# Patient Record
Sex: FEMALE | Race: WHITE | NOT HISPANIC OR LATINO
[De-identification: names, ages, dates, MRNs, and addresses within clinical notes are randomized per-mention and may not be internally consistent; named-entity substitution may affect disease eponyms.]

---

## 2024-04-10 PROBLEM — Z00.00 ENCOUNTER FOR PREVENTIVE HEALTH EXAMINATION: Status: ACTIVE | Noted: 2024-04-10

## 2024-04-25 ENCOUNTER — APPOINTMENT (OUTPATIENT)
Dept: ORTHOPEDIC SURGERY | Facility: CLINIC | Age: 74
End: 2024-04-25
Payer: MEDICARE

## 2024-04-25 VITALS — BODY MASS INDEX: 24.11 KG/M2 | WEIGHT: 150 LBS | HEIGHT: 66 IN

## 2024-04-25 DIAGNOSIS — E07.9 DISORDER OF THYROID, UNSPECIFIED: ICD-10-CM

## 2024-04-25 DIAGNOSIS — I10 ESSENTIAL (PRIMARY) HYPERTENSION: ICD-10-CM

## 2024-04-25 DIAGNOSIS — M16.11 UNILATERAL PRIMARY OSTEOARTHRITIS, RIGHT HIP: ICD-10-CM

## 2024-04-25 DIAGNOSIS — E78.00 PURE HYPERCHOLESTEROLEMIA, UNSPECIFIED: ICD-10-CM

## 2024-04-25 DIAGNOSIS — Z78.9 OTHER SPECIFIED HEALTH STATUS: ICD-10-CM

## 2024-04-25 PROCEDURE — 99214 OFFICE O/P EST MOD 30 MIN: CPT

## 2024-04-25 RX ORDER — LEVOTHYROXINE SODIUM 0.09 MG/1
88 TABLET ORAL
Refills: 0 | Status: ACTIVE | COMMUNITY

## 2024-04-25 RX ORDER — ROSUVASTATIN CALCIUM 10 MG/1
10 TABLET, FILM COATED ORAL
Refills: 0 | Status: ACTIVE | COMMUNITY

## 2024-04-25 RX ORDER — LOSARTAN POTASSIUM 100 MG/1
TABLET, FILM COATED ORAL
Refills: 0 | Status: ACTIVE | COMMUNITY

## 2024-04-25 RX ORDER — MELOXICAM 15 MG/1
15 TABLET ORAL
Qty: 45 | Refills: 0 | Status: ACTIVE | COMMUNITY
Start: 2024-04-25 | End: 1900-01-01

## 2024-04-25 RX ORDER — CARVEDILOL 3.12 MG/1
TABLET, FILM COATED ORAL
Refills: 0 | Status: ACTIVE | COMMUNITY

## 2024-04-25 NOTE — DISCUSSION/SUMMARY
[de-identified] : The patient's current medication management of their orthopedic diagnosis was reviewed today:   (1) We discussed a comprehensive treatment plan that included possible pharmaceutical management involving the use of prescription strength medications including but not limited to options such as oral Naprosyn 500mg BID, once daily Meloxicam 15 mg, or 500-650 mg Tylenol versus over the counter oral medications and topical prescription NSAID Pennsaid vs over the counter Voltaren gel.   (2) There is a moderate risk of morbidity with further treatment, especially from use of prescription strength medications and possible side effects of these medications which include upset stomach with oral medications, skin reactions to topical medications and cardiac/renal issues with long term use.   (3) I recommended that the patient follow-up with their medical physician to discuss any significant specific potential issues with long term medication use such as interactions with current medications or with exacerbation of underlying medical comorbidities.   (4) The benefits and risks associated with use of injectable, oral or topical, prescription and over the counter anti-inflammatory medications were discussed with the patient. The patient voiced understanding of the risks including but not limited to bleeding, stroke, kidney dysfunction, heart disease, and were referred to the black box warning label for further information.  Prior to appointment and during encounter with patient extensive medical records were reviewed including but not limited to, hospital records, out patient records, imaging results, and lab data. During this appointment the patient was examined, diagnoses were discussed and explained in a face to face manner. In addition extensive time was spent reviewing aforementioned diagnostic studies. Counseling including abnormal image results, differential diagnoses, treatment options, risk and benefits, lifestyle changes, current condition, and current medications was performed. Patient's comments, questions, and concerns were address and patient verbalized understanding.

## 2024-04-25 NOTE — ASSESSMENT
[FreeTextEntry1] : 74F p/w R hip OA  Meloxicam for pain continue HEP Discussed NAIN when she is read return as needed   The natural progression of Osteoarthritis was explained to the patient. We discussed the possible treatment options from conservative to operative. These included NSAIDS, Glucosamine and Chondrotin sulfate, and Physical Therapy. We also discussed that at some point they may progress to needed a NAIN. Information and pamphlets were given.

## 2024-04-25 NOTE — HISTORY OF PRESENT ILLNESS
[Gradual] : gradual [0] : 0 [9] : 9 [Radiating] : radiating [Sharp] : sharp [Intermittent] : intermittent [Rest] : rest [Sitting] : sitting [Lying in bed] : lying in bed [Retired] : Work status: retired [de-identified] : 74F p/w R hip pain. She complains of groin pain when rotating her hip. Known history of OA, trouble with shoes and socks, getting into a car. Pain is not severe, no pain with ambulation. [] : no [FreeTextEntry1] : right hip  [FreeTextEntry5] : patients' hip has been hurting for to long that she can't remember when it started.  [FreeTextEntry6] : pinching  [FreeTextEntry7] : to groin  [FreeTextEntry8] : driving/sitting  [FreeTextEntry9] : HEP [de-identified] : certain sitting positions and getting up  [de-identified] : Dr. Connolly  [de-identified] : none

## 2024-04-25 NOTE — PHYSICAL EXAM
[] : groin tenderness [Right] : right hip with pelvis [Severe arthritis (Tonnis Grade 3)] : Severe arthritis (Tonnis Grade 3)

## 2024-04-30 ENCOUNTER — APPOINTMENT (OUTPATIENT)
Dept: ORTHOPEDIC SURGERY | Facility: CLINIC | Age: 74
End: 2024-04-30
Payer: MEDICARE

## 2024-04-30 VITALS — HEIGHT: 66 IN | BODY MASS INDEX: 24.11 KG/M2 | WEIGHT: 150 LBS

## 2024-04-30 DIAGNOSIS — M75.42 IMPINGEMENT SYNDROME OF LEFT SHOULDER: ICD-10-CM

## 2024-04-30 DIAGNOSIS — Z86.39 PERSONAL HISTORY OF OTHER ENDOCRINE, NUTRITIONAL AND METABOLIC DISEASE: ICD-10-CM

## 2024-04-30 DIAGNOSIS — M75.52 BURSITIS OF LEFT SHOULDER: ICD-10-CM

## 2024-04-30 DIAGNOSIS — I10 ESSENTIAL (PRIMARY) HYPERTENSION: ICD-10-CM

## 2024-04-30 DIAGNOSIS — E78.00 PURE HYPERCHOLESTEROLEMIA, UNSPECIFIED: ICD-10-CM

## 2024-04-30 DIAGNOSIS — M75.112 INCOMPLETE ROTATOR CUFF TEAR OR RUPTURE OF LEFT SHOULDER, NOT SPECIFIED AS TRAUMATIC: ICD-10-CM

## 2024-04-30 PROCEDURE — 73030 X-RAY EXAM OF SHOULDER: CPT | Mod: LT

## 2024-04-30 PROCEDURE — 99203 OFFICE O/P NEW LOW 30 MIN: CPT

## 2024-04-30 NOTE — DISCUSSION/SUMMARY
[de-identified] : General Dx Discussion The patient was advised of the diagnosis. The natural history of the pathology was explained in full to the patient in layman's terms. All questions were answered. The risks and benefits of surgical and non-surgical treatment alternatives were explained in full to the patient.  Case Discussed. Will get an mri left shoulder for further evaluation of RCT. Continue Mobic in the interim. f/u after mri.     Entered by Lori BLANKENSHIP acting as a scribe. Instructions: Dr. Varela- The documentation recorded by the scribe accurately reflects the service I personally performed and the decisions made by me.

## 2024-04-30 NOTE — PHYSICAL EXAM
[Left] : left shoulder [Standing] : standing [Mild] : mild [] : no erythema [TWNoteComboBox7] : active forward flexion 160 degrees [TWNoteComboBox6] : internal rotation L4

## 2024-04-30 NOTE — HISTORY OF PRESENT ILLNESS
[Sudden] : sudden [4] : 4 [Radiating] : radiating [Shooting] : shooting [Intermittent] : intermittent [de-identified] : Patient c/o left shoulder pain since this past November after getting her flu shot. Pain is aggravated with lifting and using her arm. Pain at night sleeping. She just started taking mobic with some relief. No previous shoulder issues.  [] : no [FreeTextEntry1] : l shoulder [FreeTextEntry5] : pain since getting flu shot last Nov 2023 [FreeTextEntry7] : down arm [FreeTextEntry9] : meloxicam [de-identified] : certain movements